# Patient Record
Sex: FEMALE | URBAN - METROPOLITAN AREA
[De-identification: names, ages, dates, MRNs, and addresses within clinical notes are randomized per-mention and may not be internally consistent; named-entity substitution may affect disease eponyms.]

---

## 2020-06-18 ENCOUNTER — LAB OUTSIDE AN ENCOUNTER (OUTPATIENT)
Dept: URBAN - METROPOLITAN AREA CLINIC 13 | Facility: CLINIC | Age: 85
End: 2020-06-18

## 2020-06-18 LAB
LAB AP FINAL DIAGNOSIS (RICH): (no result)
LAB AP GROSS DESCRIPTION (RICH): (no result)

## 2020-07-14 ENCOUNTER — OFFICE VISIT (OUTPATIENT)
Dept: URBAN - METROPOLITAN AREA CLINIC 113 | Facility: CLINIC | Age: 85
End: 2020-07-14

## 2020-07-20 ENCOUNTER — CLAIMS CREATED FROM THE CLAIM WINDOW (OUTPATIENT)
Dept: URBAN - METROPOLITAN AREA CLINIC 4 | Facility: CLINIC | Age: 85
End: 2020-07-20
Payer: MEDICARE

## 2020-07-20 ENCOUNTER — OFFICE VISIT (OUTPATIENT)
Dept: URBAN - METROPOLITAN AREA SURGERY CENTER 25 | Facility: SURGERY CENTER | Age: 85
End: 2020-07-20

## 2020-07-20 DIAGNOSIS — K22.10 ULCER OF ESOPHAGUS WITHOUT BLEEDING: ICD-10-CM

## 2020-07-20 DIAGNOSIS — K21.0 GASTRO-ESOPHAGEAL REFLUX DISEASE WITH ESOPHAGITIS: ICD-10-CM

## 2020-07-20 PROCEDURE — 88305 TISSUE EXAM BY PATHOLOGIST: CPT | Performed by: PATHOLOGY

## 2020-07-20 PROCEDURE — 88342 IMHCHEM/IMCYTCHM 1ST ANTB: CPT | Performed by: PATHOLOGY

## 2020-07-25 ENCOUNTER — TELEPHONE ENCOUNTER (OUTPATIENT)
Dept: URBAN - METROPOLITAN AREA CLINIC 13 | Facility: CLINIC | Age: 85
End: 2020-07-25

## 2020-07-25 RX ORDER — CHOLECALCIFEROL (VITAMIN D3) 25 MCG
TABLET,CHEWABLE ORAL
Refills: 0 | OUTPATIENT
End: 2015-12-10

## 2020-07-25 RX ORDER — BUSPIRONE HYDROCHLORIDE 15 MG/1
TAKE 1/2 TABLET TWICE DAILY TABLET ORAL
Refills: 0 | OUTPATIENT
End: 2020-07-09

## 2020-07-25 RX ORDER — CELECOXIB 50 MG/1
CAPSULE ORAL
Refills: 0 | OUTPATIENT
Start: 2006-11-03 | End: 2010-09-23

## 2020-07-25 RX ORDER — COLESEVELAM HYDROCHLORIDE 625 MG/1
TAKE 1 TABLET TWICE DAILY TABLET, FILM COATED ORAL
Qty: 60 | Refills: 3 | OUTPATIENT
Start: 2014-06-26 | End: 2020-07-09

## 2020-07-25 RX ORDER — ROSUVASTATIN CALCIUM 5 MG
TABLET ORAL
Refills: 0 | OUTPATIENT
Start: 2006-11-03 | End: 2010-09-23

## 2020-07-25 RX ORDER — PAROXETINE HYDROCHLORIDE 40 MG/1
TABLET, FILM COATED ORAL
Refills: 0 | OUTPATIENT
Start: 2006-11-03 | End: 2010-09-23

## 2020-07-25 RX ORDER — LOSARTAN POTASSIUM 50 MG/1
TAKE 1 TABLET DAILY TABLET, FILM COATED ORAL
Refills: 0 | OUTPATIENT
End: 2020-07-09

## 2020-07-25 RX ORDER — AMLODIPINE BESYLATE 5 MG/1
TAKE 1 TABLET DAILY TABLET ORAL
Refills: 0 | OUTPATIENT
End: 2020-07-09

## 2020-07-25 RX ORDER — COLESEVELAM HYDROCHLORIDE 625 MG/1
TAKE 1 TABLET DAILY TABLET, FILM COATED ORAL
Refills: 0 | OUTPATIENT
End: 2014-06-26

## 2020-07-25 RX ORDER — DULOXETINE HCL 30 MG
TAKE 1 CAPSULE DAILY CAPSULE,DELAYED RELEASE (ENTERIC COATED) ORAL
Refills: 0 | OUTPATIENT
End: 2020-07-15

## 2020-07-25 RX ORDER — CELECOXIB 200 MG/1
CAPSULE ORAL
Refills: 0 | OUTPATIENT
End: 2020-07-14

## 2020-07-25 RX ORDER — CARVEDILOL 3.12 MG/1
TAKE 1 TABLET TWICE DAILY WITH MEALS TABLET, FILM COATED ORAL
Refills: 0 | OUTPATIENT
End: 2020-07-09

## 2020-07-25 RX ORDER — DULOXETINE HCL 30 MG
TAKE 1 CAPSULE DAILY CAPSULE,DELAYED RELEASE (ENTERIC COATED) ORAL
Refills: 0 | OUTPATIENT
End: 2020-07-09

## 2020-07-25 RX ORDER — SUCRALFATE 1 G/1
TAKE 1 TABLET 4 TIMES A DAY, BEFORE MEALS AND AT BEDTIME TABLET ORAL
Qty: 120 | Refills: 1 | OUTPATIENT
End: 2020-07-15

## 2020-07-25 RX ORDER — DEXLANSOPRAZOLE 60 MG/1
TAKE 1 CAPSULE DAILY EVERY MORNING BEFORE BREAKFAST CAPSULE, DELAYED RELEASE ORAL
Qty: 30 | Refills: 5 | OUTPATIENT
Start: 2014-06-26 | End: 2015-12-10

## 2020-07-25 RX ORDER — DONEPEZIL HYDROCHLORIDE 10 MG/1
TAKE 1 TABLET DAILY TABLET, FILM COATED ORAL
Refills: 0 | OUTPATIENT
End: 2020-07-09

## 2020-07-25 RX ORDER — PANTOPRAZOLE SODIUM 40 MG/1
TAKE 1 TABLET DAILY TABLET, DELAYED RELEASE ORAL
Qty: 30 | Refills: 6 | OUTPATIENT
Start: 2014-07-24 | End: 2020-07-09

## 2020-07-25 RX ORDER — IBANDRONATE SODIUM 150 MG
TAKE 1 TABLET ONCE MONTHLY TABLET ORAL
Refills: 0 | OUTPATIENT
End: 2020-07-14

## 2020-07-26 ENCOUNTER — TELEPHONE ENCOUNTER (OUTPATIENT)
Dept: URBAN - METROPOLITAN AREA CLINIC 13 | Facility: CLINIC | Age: 85
End: 2020-07-26

## 2020-07-26 RX ORDER — OMEPRAZOLE 20 MG/1
CAPSULE, DELAYED RELEASE ORAL
Qty: 30 | Refills: 0 | Status: ACTIVE | COMMUNITY
Start: 2020-02-01

## 2020-07-26 RX ORDER — FLUTICASONE PROPIONATE 50 UG/1
SPRAY, METERED NASAL
Qty: 16 | Refills: 0 | Status: ACTIVE | COMMUNITY
Start: 2020-02-14

## 2020-07-26 RX ORDER — OMEPRAZOLE 40 MG/1
TAKE 1 CAPSULE TWICE DAILY BEFORE MEALS CAPSULE, DELAYED RELEASE ORAL
Refills: 0 | Status: ACTIVE | COMMUNITY

## 2020-07-26 RX ORDER — CHROMIUM 200 MCG
TAKE 1 TABLET DAILY TABLET ORAL
Refills: 0 | Status: ACTIVE | COMMUNITY

## 2020-07-26 RX ORDER — BISACODYL 5 MG/1
TAKE 1 TABLET BY MOUTH EVERY DAY AS NEEDED FOR CONSTIPATION TABLET, COATED ORAL
Qty: 30 | Refills: 0 | Status: ACTIVE | COMMUNITY
Start: 2020-05-21

## 2020-07-26 RX ORDER — DOCUSATE SODIUM 100 MG
TAKE 1 TABLET DAILY AS DIRECTED TABLET ORAL
Refills: 0 | Status: ACTIVE | COMMUNITY

## 2020-07-26 RX ORDER — CELECOXIB 100 MG/1
CAPSULE ORAL
Qty: 15 | Refills: 0 | Status: ACTIVE | COMMUNITY
Start: 2020-02-01

## 2020-07-26 RX ORDER — GABAPENTIN 300 MG/1
TAKE 1 CAPSULE 3 TIMES DAILY AS NEEDED FOR ANXIETY CAPSULE ORAL
Refills: 0 | Status: ACTIVE | COMMUNITY

## 2020-07-26 RX ORDER — GABAPENTIN 300 MG/1
TAKE 1 CAPSULE DAILY CAPSULE ORAL
Refills: 0 | Status: ACTIVE | COMMUNITY

## 2020-07-26 RX ORDER — POLYETHYLENE GLYCOL 3350 17 G/17G
MIX 17 GRAMS IN 8 OUNCES OF LIQUID AND DRINK BY MOUTH ONCE DAILY POWDER, FOR SOLUTION ORAL
Qty: 510 | Refills: 0 | Status: ACTIVE | COMMUNITY
Start: 2020-07-14

## 2020-07-26 RX ORDER — MEMANTINE HYDROCHLORIDE 10 MG/1
TAKE 1 TABLET TWICE DAILY TABLET ORAL
Refills: 0 | Status: ACTIVE | COMMUNITY

## 2020-07-26 RX ORDER — AMLODIPINE BESYLATE 5 MG
TAKE 1 TABLET DAILY AS DIRECTED TABLET ORAL
Refills: 0 | Status: ACTIVE | COMMUNITY

## 2020-07-26 RX ORDER — LATANOPROST/PF 0.005 %
DROPS OPHTHALMIC (EYE)
Qty: 2 | Refills: 0 | Status: ACTIVE | COMMUNITY
Start: 2020-04-27

## 2020-07-26 RX ORDER — GUAIFENESIN AND DEXTROMETHORPHAN HYDROBROMIDE 1200; 60 MG/1; MG/1
TAKE 1 TABLET BY MOUTH TWO (2) TIMES A DAY FOR 7 DAYS TABLET, EXTENDED RELEASE ORAL
Qty: 14 | Refills: 0 | Status: ACTIVE | COMMUNITY
Start: 2019-12-17

## 2020-07-26 RX ORDER — LATANOPROST/PF 0.005 %
INSTILL 1 DROP  INTO AFFECTED EYE(S) ONCE DAILY AS DIRECTED DROPS OPHTHALMIC (EYE)
Refills: 0 | Status: ACTIVE | COMMUNITY

## 2020-07-26 RX ORDER — DONEPEZIL HYDROCHLORIDE 10 MG/1
TABLET, FILM COATED ORAL
Refills: 0 | Status: ACTIVE | COMMUNITY

## 2020-07-26 RX ORDER — IBANDRONATE SODIUM 150 MG
TAKE AS DIRECTED TABLET ORAL
Refills: 0 | Status: ACTIVE | COMMUNITY

## 2020-07-26 RX ORDER — GUARN/MA-HUANG/P.GIN/S.GINSENG
TAKE 1 TABLET DAILY TABLET ORAL
Refills: 0 | Status: ACTIVE | COMMUNITY

## 2020-07-26 RX ORDER — CLONAZEPAM 1 MG/1
TAKE 1 TABLET AT BEDTIME TABLET ORAL
Refills: 0 | Status: ACTIVE | COMMUNITY

## 2020-07-26 RX ORDER — CELECOXIB 100 MG
TAKE 1 CAPSULE EVERY OTHER DAY CAPSULE ORAL
Refills: 1 | Status: ACTIVE | COMMUNITY

## 2020-07-26 RX ORDER — CHLORHEXIDINE GLUCONATE 4 %
TAKE 1 TABLET BY MOUTH DAILY LIQUID (ML) TOPICAL
Qty: 90 | Refills: 3 | Status: ACTIVE | COMMUNITY

## 2020-07-26 RX ORDER — CETIRIZINE HYDROCHLORIDE 10 MG/1
TAKE 1 TABLET AT BEDTIME TABLET, FILM COATED ORAL
Refills: 0 | Status: ACTIVE | COMMUNITY

## 2020-07-26 RX ORDER — TRAMADOL HYDROCHLORIDE 50 MG/1
TAKE 1-2 TABLET EVERY 4-6 HOURS PRN PAIN TABLET, COATED ORAL
Refills: 0 | Status: ACTIVE | COMMUNITY

## 2020-07-26 RX ORDER — SUCRALFATE 1 G/10ML
TAKE 10 ML BY MOUTH FOUR TIMES DAILY BEFORE EACH MEAL AND AT BEDTIME SUSPENSION ORAL
Qty: 420 | Refills: 2 | Status: ACTIVE | COMMUNITY
Start: 2020-07-15

## 2020-07-26 RX ORDER — ACETAMINOPHEN 325 MG/1
TAKE 1 TABLET 3 TIMES DAILY TABLET, FILM COATED ORAL
Refills: 0 | Status: ACTIVE | COMMUNITY

## 2020-07-26 RX ORDER — PHENAZOPYRIDINE HYDROCHLORIDE 100 MG/1
TAKE CAPSULE DAILY TABLET, COATED ORAL
Refills: 0 | Status: ACTIVE | COMMUNITY

## 2020-08-11 ENCOUNTER — OFFICE VISIT (OUTPATIENT)
Dept: URBAN - METROPOLITAN AREA SURGERY CENTER 25 | Facility: SURGERY CENTER | Age: 85
End: 2020-08-11

## 2020-08-12 PROBLEM — 397881000: Status: ACTIVE | Noted: 2020-08-12

## 2020-08-12 PROBLEM — 16761005: Status: ACTIVE | Noted: 2020-08-12

## 2020-08-12 PROBLEM — 197125005 IRRITABLE BOWEL SYNDROME WITH DIARRHEA: Status: ACTIVE | Noted: 2020-08-12

## 2020-08-12 PROBLEM — 79922009: Status: ACTIVE | Noted: 2020-08-12

## 2020-08-13 ENCOUNTER — WEB ENCOUNTER (OUTPATIENT)
Dept: URBAN - METROPOLITAN AREA CLINIC 113 | Facility: CLINIC | Age: 85
End: 2020-08-13

## 2020-08-13 ENCOUNTER — DASHBOARD ENCOUNTERS (OUTPATIENT)
Age: 85
End: 2020-08-13

## 2020-08-13 ENCOUNTER — OFFICE VISIT (OUTPATIENT)
Dept: URBAN - METROPOLITAN AREA CLINIC 113 | Facility: CLINIC | Age: 85
End: 2020-08-13
Payer: MEDICARE

## 2020-08-13 VITALS
HEIGHT: 58 IN | DIASTOLIC BLOOD PRESSURE: 58 MMHG | HEART RATE: 63 BPM | TEMPERATURE: 96.9 F | SYSTOLIC BLOOD PRESSURE: 108 MMHG | WEIGHT: 110 LBS | BODY MASS INDEX: 23.09 KG/M2 | RESPIRATION RATE: 18 BRPM

## 2020-08-13 DIAGNOSIS — R13.14 PHARYNGOESOPHAGEAL DYSPHAGIA: ICD-10-CM

## 2020-08-13 DIAGNOSIS — K22.2 ESOPHAGEAL STENOSIS: ICD-10-CM

## 2020-08-13 DIAGNOSIS — K21.0 GERD WITH ESOPHAGITIS: ICD-10-CM

## 2020-08-13 DIAGNOSIS — R13.10 ODYNOPHAGIA: ICD-10-CM

## 2020-08-13 PROCEDURE — G8420 CALC BMI NORM PARAMETERS: HCPCS | Performed by: NURSE PRACTITIONER

## 2020-08-13 PROCEDURE — 99213 OFFICE O/P EST LOW 20 MIN: CPT | Performed by: NURSE PRACTITIONER

## 2020-08-13 PROCEDURE — G8427 DOCREV CUR MEDS BY ELIG CLIN: HCPCS | Performed by: NURSE PRACTITIONER

## 2020-08-13 PROCEDURE — 1036F TOBACCO NON-USER: CPT | Performed by: NURSE PRACTITIONER

## 2020-08-13 RX ORDER — GABAPENTIN 300 MG/1
TAKE 1 CAPSULE DAILY CAPSULE ORAL
Refills: 0 | Status: DISCONTINUED | COMMUNITY

## 2020-08-13 RX ORDER — AMLODIPINE BESYLATE 5 MG
TAKE 1 TABLET DAILY AS DIRECTED TABLET ORAL
Refills: 0 | Status: ACTIVE | COMMUNITY

## 2020-08-13 RX ORDER — GUARN/MA-HUANG/P.GIN/S.GINSENG
TAKE 1 TABLET DAILY TABLET ORAL
Refills: 0 | Status: ACTIVE | COMMUNITY

## 2020-08-13 RX ORDER — DONEPEZIL HYDROCHLORIDE 10 MG/1
1 TABLET AT BEDTIME TABLET, FILM COATED ORAL ONCE A DAY
Refills: 0 | Status: ACTIVE | COMMUNITY

## 2020-08-13 RX ORDER — SUCRALFATE 1 G/10ML
TAKE 10 ML BY MOUTH FOUR TIMES DAILY BEFORE EACH MEAL AND AT BEDTIME SUSPENSION ORAL
Qty: 420 | Refills: 2 | Status: ACTIVE | COMMUNITY
Start: 2020-07-15

## 2020-08-13 RX ORDER — ACETAMINOPHEN 325 MG/1
TAKE 1 TABLET 3 TIMES DAILY TABLET, FILM COATED ORAL
Refills: 0 | Status: DISCONTINUED | COMMUNITY

## 2020-08-13 RX ORDER — POLYETHYLENE GLYCOL 3350 17 G/17G
MIX 17 GRAMS IN 8 OUNCES OF LIQUID AND DRINK BY MOUTH ONCE DAILY POWDER, FOR SOLUTION ORAL
Qty: 510 | Refills: 0 | Status: DISCONTINUED | COMMUNITY
Start: 2020-07-14

## 2020-08-13 RX ORDER — CLONAZEPAM 1 MG/1
TAKE 1 TABLET AT BEDTIME TABLET ORAL
Refills: 0 | Status: ACTIVE | COMMUNITY

## 2020-08-13 RX ORDER — GUAIFENESIN AND DEXTROMETHORPHAN HYDROBROMIDE 1200; 60 MG/1; MG/1
TAKE 1 TABLET BY MOUTH TWO (2) TIMES A DAY FOR 7 DAYS TABLET, EXTENDED RELEASE ORAL
Qty: 14 | Refills: 0 | Status: DISCONTINUED | COMMUNITY
Start: 2019-12-17

## 2020-08-13 RX ORDER — TRAMADOL HYDROCHLORIDE 50 MG/1
TAKE 1-2 TABLET EVERY 4-6 HOURS PRN PAIN TABLET, COATED ORAL
Refills: 0 | Status: ACTIVE | COMMUNITY

## 2020-08-13 RX ORDER — CELECOXIB 100 MG
TAKE 1 CAPSULE EVERY OTHER DAY CAPSULE ORAL
Refills: 1 | Status: ACTIVE | COMMUNITY

## 2020-08-13 RX ORDER — FLUTICASONE PROPIONATE 50 UG/1
SPRAY, METERED NASAL
Qty: 16 | Refills: 0 | Status: ACTIVE | COMMUNITY
Start: 2020-02-14

## 2020-08-13 RX ORDER — MEMANTINE HYDROCHLORIDE 10 MG/1
TAKE 1 TABLET TWICE DAILY TABLET ORAL
Refills: 0 | Status: DISCONTINUED | COMMUNITY

## 2020-08-13 RX ORDER — CETIRIZINE HYDROCHLORIDE 10 MG/1
TAKE 1 TABLET AT BEDTIME TABLET, FILM COATED ORAL
Refills: 0 | Status: ACTIVE | COMMUNITY

## 2020-08-13 RX ORDER — OMEPRAZOLE 20 MG/1
CAPSULE, DELAYED RELEASE ORAL
Qty: 30 | Refills: 0 | Status: DISCONTINUED | COMMUNITY
Start: 2020-02-01

## 2020-08-13 RX ORDER — MEMANTINE HYDROCHLORIDE 10 MG/1
TAKE 1 TABLET TWICE DAILY TABLET ORAL
Refills: 0 | Status: ACTIVE | COMMUNITY

## 2020-08-13 RX ORDER — PHENAZOPYRIDINE HYDROCHLORIDE 100 MG/1
TAKE CAPSULE DAILY TABLET, COATED ORAL
Refills: 0 | Status: ACTIVE | COMMUNITY

## 2020-08-13 RX ORDER — CELECOXIB 100 MG/1
CAPSULE ORAL
Qty: 15 | Refills: 0 | Status: DISCONTINUED | COMMUNITY
Start: 2020-02-01

## 2020-08-13 RX ORDER — CHLORHEXIDINE GLUCONATE 4 %
TAKE 1 TABLET BY MOUTH DAILY LIQUID (ML) TOPICAL
Qty: 90 | Refills: 3 | Status: ACTIVE | COMMUNITY

## 2020-08-13 RX ORDER — CHROMIUM 200 MCG
TAKE 1 TABLET DAILY TABLET ORAL
Refills: 0 | Status: ACTIVE | COMMUNITY

## 2020-08-13 RX ORDER — LATANOPROST/PF 0.005 %
INSTILL 1 DROP  INTO AFFECTED EYE(S) ONCE DAILY AS DIRECTED DROPS OPHTHALMIC (EYE)
Refills: 0 | Status: ACTIVE | COMMUNITY

## 2020-08-13 RX ORDER — IBANDRONATE SODIUM 150 MG
TAKE AS DIRECTED TABLET ORAL
Refills: 0 | Status: ACTIVE | COMMUNITY

## 2020-08-13 RX ORDER — GABAPENTIN 300 MG/1
TAKE 1 CAPSULE 3 TIMES DAILY AS NEEDED FOR ANXIETY CAPSULE ORAL
Refills: 0 | Status: ACTIVE | COMMUNITY

## 2020-08-13 RX ORDER — BISACODYL 5 MG/1
TAKE 1 TABLET BY MOUTH EVERY DAY AS NEEDED FOR CONSTIPATION TABLET, COATED ORAL
Qty: 30 | Refills: 0 | Status: ACTIVE | COMMUNITY
Start: 2020-05-21

## 2020-08-13 RX ORDER — DOCUSATE SODIUM 100 MG
TAKE 1 TABLET DAILY AS DIRECTED TABLET ORAL
Refills: 0 | Status: ACTIVE | COMMUNITY

## 2020-08-13 RX ORDER — OMEPRAZOLE 40 MG/1
TAKE 1 CAPSULE TWICE DAILY BEFORE MEALS CAPSULE, DELAYED RELEASE ORAL
Refills: 0 | Status: ACTIVE | COMMUNITY

## 2020-08-13 NOTE — HPI-TODAY'S VISIT:
87 year old female with Alzheimer's dementia, prior TIA, and CAD not amendable to PTCA on Plavix, presenting for follow-up after EGD. She was last seen 7/14/2020 for follow-up after hospitalization for dysphagia and odynophagia secondary to severe reflux esophagitis with stricture.  Her symptoms had minimally improved with high-dose acid suppression.  Repeat EGD with possible esophageal dilation was planned.  Regarding chronic constipation, she was recommended a daily bowel regimen with MiraLAX. The EGD was performed on 7/20/2020.  Findings included moderately severe erosive esophagitis to the proximal esophagus, a benign-appearing esophageal stenosis in the distal esophagus status post Savary dilation to 13 mm, and nonspecific esophageal spasm.  Lower third esophageal biopsies showed severe reflux type changes consistent with ulcerative esophagitis; negative for Dixon's or eosinophilic esophagitis. Her symptoms initially improved following esophageal dilation, but have recurred within the last several days.  She complains of significant pain with swallowing, which makes it difficult to eat, drink, or take her medications. Food is no longer becoming lodged in the esophagus, and she has been able to tolerate a normal diet.  Initially, Carafate elixir was helpful in alleviating her discomfort, however, she had to resume Carafate tablets due to cost.  She is drinking one Ensure daily, and her weight is stable when compared to the prior visit.  She denies abdominal pain, nausea or vomiting. I spoke with her nurse manager, Aidee, at [facility] following the visit. The patient's status has overall improved, but she continues to consume less than 50% of her meals. Her sucralfate suspension was changed to oral tablets earlier this week, which may coincide with symptom recurrence. She endorses that the patient is drinking one Ensure daily, and verifies her medication list.

## 2020-08-13 NOTE — HPI-OTHER HISTORIES
She was seen in hospital consultation by Dr. Coleman on 6/18/20 for evaluation of dysphagia and odynophagia, with barium swallow demonstrating esophageal dysmotility and possible stricture just proximal to the GE junction. An EGD was performed on 6/18/20 revealing LA grade D reflux esophagitis and a benign-appearing esophageal stenosis. Esophageal biopsies showed ulceration with adherent fibropurulent exudate, and acute esophagitis; special stains were negative for HSV, CMV, and fungi. She was recommended twice daily PPI, with plan for repeat EGD in 6 weeks to document healing.

## 2020-08-14 PROBLEM — 30233002: Status: ACTIVE | Noted: 2020-08-12

## 2020-08-14 PROBLEM — 300286002: Status: ACTIVE | Noted: 2020-08-12

## 2020-08-14 PROBLEM — 266433003: Status: ACTIVE | Noted: 2020-08-12

## 2020-08-14 PROBLEM — 40739000: Status: ACTIVE | Noted: 2020-08-12

## 2020-08-18 ENCOUNTER — TELEPHONE ENCOUNTER (OUTPATIENT)
Dept: URBAN - METROPOLITAN AREA CLINIC 113 | Facility: CLINIC | Age: 85
End: 2020-08-18

## 2020-09-14 ENCOUNTER — OFFICE VISIT (OUTPATIENT)
Dept: URBAN - METROPOLITAN AREA CLINIC 113 | Facility: CLINIC | Age: 85
End: 2020-09-14

## 2020-10-13 ENCOUNTER — OFFICE VISIT (OUTPATIENT)
Dept: URBAN - METROPOLITAN AREA CLINIC 113 | Facility: CLINIC | Age: 85
End: 2020-10-13

## 2020-10-13 RX ORDER — LATANOPROST/PF 0.005 %
INSTILL 1 DROP  INTO AFFECTED EYE(S) ONCE DAILY AS DIRECTED DROPS OPHTHALMIC (EYE)
Refills: 0 | Status: ACTIVE | COMMUNITY

## 2020-10-13 RX ORDER — CELECOXIB 100 MG
TAKE 1 CAPSULE EVERY OTHER DAY CAPSULE ORAL
Refills: 1 | Status: ACTIVE | COMMUNITY

## 2020-10-13 RX ORDER — GUARN/MA-HUANG/P.GIN/S.GINSENG
TAKE 1 TABLET DAILY TABLET ORAL
Refills: 0 | Status: ACTIVE | COMMUNITY

## 2020-10-13 RX ORDER — PHENAZOPYRIDINE HYDROCHLORIDE 100 MG/1
TAKE CAPSULE DAILY TABLET, COATED ORAL
Refills: 0 | Status: ACTIVE | COMMUNITY

## 2020-10-13 RX ORDER — TRAMADOL HYDROCHLORIDE 50 MG/1
TAKE 1-2 TABLET EVERY 4-6 HOURS PRN PAIN TABLET, COATED ORAL
Refills: 0 | Status: ACTIVE | COMMUNITY

## 2020-10-13 RX ORDER — CHROMIUM 200 MCG
TAKE 1 TABLET DAILY TABLET ORAL
Refills: 0 | Status: ACTIVE | COMMUNITY

## 2020-10-13 RX ORDER — MEMANTINE HYDROCHLORIDE 10 MG/1
TAKE 1 TABLET TWICE DAILY TABLET ORAL
Refills: 0 | Status: ACTIVE | COMMUNITY

## 2020-10-13 RX ORDER — AMLODIPINE BESYLATE 5 MG
TAKE 1 TABLET DAILY AS DIRECTED TABLET ORAL
Refills: 0 | Status: ACTIVE | COMMUNITY

## 2020-10-13 RX ORDER — SUCRALFATE 1 G/10ML
TAKE 10 ML BY MOUTH FOUR TIMES DAILY BEFORE EACH MEAL AND AT BEDTIME SUSPENSION ORAL
Qty: 420 | Refills: 2 | Status: ACTIVE | COMMUNITY
Start: 2020-07-15

## 2020-10-13 RX ORDER — OMEPRAZOLE 40 MG/1
TAKE 1 CAPSULE TWICE DAILY BEFORE MEALS CAPSULE, DELAYED RELEASE ORAL
Refills: 0 | Status: ACTIVE | COMMUNITY

## 2020-10-13 RX ORDER — GABAPENTIN 300 MG/1
TAKE 1 CAPSULE 3 TIMES DAILY AS NEEDED FOR ANXIETY CAPSULE ORAL
Refills: 0 | Status: ACTIVE | COMMUNITY

## 2020-10-13 RX ORDER — IBANDRONATE SODIUM 150 MG
TAKE AS DIRECTED TABLET ORAL
Refills: 0 | Status: ACTIVE | COMMUNITY

## 2020-10-13 RX ORDER — DOCUSATE SODIUM 100 MG
TAKE 1 TABLET DAILY AS DIRECTED TABLET ORAL
Refills: 0 | Status: ACTIVE | COMMUNITY

## 2020-10-13 RX ORDER — DONEPEZIL HYDROCHLORIDE 10 MG/1
1 TABLET AT BEDTIME TABLET, FILM COATED ORAL ONCE A DAY
Refills: 0 | Status: ACTIVE | COMMUNITY

## 2020-10-13 RX ORDER — CETIRIZINE HYDROCHLORIDE 10 MG/1
TAKE 1 TABLET AT BEDTIME TABLET, FILM COATED ORAL
Refills: 0 | Status: ACTIVE | COMMUNITY

## 2020-10-13 RX ORDER — CHLORHEXIDINE GLUCONATE 4 %
TAKE 1 TABLET BY MOUTH DAILY LIQUID (ML) TOPICAL
Qty: 90 | Refills: 3 | Status: ACTIVE | COMMUNITY

## 2020-10-13 RX ORDER — CLONAZEPAM 1 MG/1
TAKE 1 TABLET AT BEDTIME TABLET ORAL
Refills: 0 | Status: ACTIVE | COMMUNITY

## 2020-10-13 RX ORDER — BISACODYL 5 MG/1
TAKE 1 TABLET BY MOUTH EVERY DAY AS NEEDED FOR CONSTIPATION TABLET, COATED ORAL
Qty: 30 | Refills: 0 | Status: ACTIVE | COMMUNITY
Start: 2020-05-21

## 2020-10-13 RX ORDER — FLUTICASONE PROPIONATE 50 UG/1
SPRAY, METERED NASAL
Qty: 16 | Refills: 0 | Status: ACTIVE | COMMUNITY
Start: 2020-02-14

## 2020-10-13 NOTE — HPI-TODAY'S VISIT:
Ms. Bedoya is an 87-year-old woman with a history of Alzheimer's dementia, prior TIA and coronary artery disease on Plavix, presenting for follow-up She was last seen on 8/13/2020 following an EGD notable for severe erosive esophagitis, likely pill induced.  She was to continue omeprazole 40 mg twice daily and Carafate 1 g 4 times daily slurry.  She was to discontinue iron and calcium supplements, and avoid all NSAIDs.

## 2020-10-28 ENCOUNTER — OFFICE VISIT (OUTPATIENT)
Dept: URBAN - METROPOLITAN AREA CLINIC 113 | Facility: CLINIC | Age: 85
End: 2020-10-28

## 2023-10-24 ENCOUNTER — OFFICE VISIT (OUTPATIENT)
Dept: URBAN - METROPOLITAN AREA CLINIC 113 | Facility: CLINIC | Age: 88
End: 2023-10-24